# Patient Record
Sex: MALE
[De-identification: names, ages, dates, MRNs, and addresses within clinical notes are randomized per-mention and may not be internally consistent; named-entity substitution may affect disease eponyms.]

---

## 2024-08-11 ENCOUNTER — NON-APPOINTMENT (OUTPATIENT)
Age: 19
End: 2024-08-11

## 2024-08-14 ENCOUNTER — APPOINTMENT (OUTPATIENT)
Dept: SURGERY | Facility: CLINIC | Age: 19
End: 2024-08-14
Payer: COMMERCIAL

## 2024-08-14 VITALS
WEIGHT: 170 LBS | BODY MASS INDEX: 21.82 KG/M2 | TEMPERATURE: 97 F | HEIGHT: 74 IN | HEART RATE: 70 BPM | SYSTOLIC BLOOD PRESSURE: 122 MMHG | DIASTOLIC BLOOD PRESSURE: 70 MMHG | OXYGEN SATURATION: 95 %

## 2024-08-14 DIAGNOSIS — N50.89 OTHER SPECIFIED DISORDERS OF THE MALE GENITAL ORGANS: ICD-10-CM

## 2024-08-14 PROBLEM — Z00.00 ENCOUNTER FOR PREVENTIVE HEALTH EXAMINATION: Status: ACTIVE | Noted: 2024-08-14

## 2024-08-14 PROCEDURE — 99203 OFFICE O/P NEW LOW 30 MIN: CPT

## 2024-08-14 NOTE — PHYSICAL EXAM
[JVD] : no jugular venous distention  [Purpura] : no purpura  [Alert] : alert [Calm] : calm [de-identified] : Normal  [de-identified] : Normal  [de-identified] : Normal  no cough impulse   left scrotal swelling slighlty above testicle [de-identified] : Normal

## 2024-08-14 NOTE — HISTORY OF PRESENT ILLNESS
[de-identified] : Mr. CELIA CHRISTINE is a 19 year  old man. He presented to the office for the first time on 08/14/2024 , his primary is Unable to Collect PCP . is having a left groin swelling it doesnt cause pain  change in urine/ ejaucalation no cough impulse

## 2024-08-14 NOTE — ASSESSMENT
[FreeTextEntry1] : Mr. CELIA CHRISTINE is a 19 year  old man. He presented to the office for the first time on 08/14/2024 , his primary is Unable to Collect PCP . is having a left groin swelling it doesnt cause pain  change in urine/ ejaucalation no cough impulse   exam : left scrotal swelling slighlty above testicle  will get us scrotum and then see urologist  We explained in great detail the pathophysiology of the disease process. We peter diagrams and discussed the workup for diagnosis and management. The various options were explained to the patient. The Risk , benefit and alternatives were discussed. We discussed recovery and possible complications. The Post operative care was explained to the patient. He was counselled on diet , exercise and wound care. We discussed the pathology and surgery with him.  We discussed for over 45  min, including her present medical conditions, medications and if they need to be changed, the medications she is on and various surgical and non-surgical options. The Risk, benefit and alternatives were discussed. We discussed recovery and possible complications. his radiological images and labs were independently reviewed in the PACS by me. The Images were reviewed with him .   We discussed the importance of close follow up. We informed that he needs to follow up with urologist.  . We also informed that he can call us if anything changes or has any questions.     Laura Del Valle MD Minimally Invasive Surgery Foregut and Hnvakt-Eyvtqogwn-Evjnltq Surgery  [facility]. [address] , [address], [place] [address] Phone: [phone] Fax: [phone]

## 2025-03-13 ENCOUNTER — NON-APPOINTMENT (OUTPATIENT)
Age: 20
End: 2025-03-13